# Patient Record
Sex: FEMALE | Race: WHITE | NOT HISPANIC OR LATINO
[De-identification: names, ages, dates, MRNs, and addresses within clinical notes are randomized per-mention and may not be internally consistent; named-entity substitution may affect disease eponyms.]

---

## 2023-08-17 ENCOUNTER — APPOINTMENT (OUTPATIENT)
Dept: ANTEPARTUM | Facility: CLINIC | Age: 38
End: 2023-08-17
Payer: COMMERCIAL

## 2023-08-17 ENCOUNTER — ASOB RESULT (OUTPATIENT)
Age: 38
End: 2023-08-17

## 2023-08-17 PROCEDURE — 36415 COLL VENOUS BLD VENIPUNCTURE: CPT

## 2023-08-17 PROCEDURE — 93976 VASCULAR STUDY: CPT

## 2023-08-17 PROCEDURE — 76813 OB US NUCHAL MEAS 1 GEST: CPT

## 2023-09-14 ENCOUNTER — APPOINTMENT (OUTPATIENT)
Dept: ANTEPARTUM | Facility: CLINIC | Age: 38
End: 2023-09-14
Payer: COMMERCIAL

## 2023-09-14 ENCOUNTER — ASOB RESULT (OUTPATIENT)
Age: 38
End: 2023-09-14

## 2023-09-14 PROCEDURE — 76817 TRANSVAGINAL US OBSTETRIC: CPT

## 2023-09-14 PROCEDURE — 76805 OB US >/= 14 WKS SNGL FETUS: CPT

## 2023-10-19 ENCOUNTER — APPOINTMENT (OUTPATIENT)
Dept: ANTEPARTUM | Facility: CLINIC | Age: 38
End: 2023-10-19
Payer: COMMERCIAL

## 2023-10-19 ENCOUNTER — ASOB RESULT (OUTPATIENT)
Age: 38
End: 2023-10-19

## 2023-10-19 PROCEDURE — 76811 OB US DETAILED SNGL FETUS: CPT

## 2023-10-19 PROCEDURE — 76817 TRANSVAGINAL US OBSTETRIC: CPT | Mod: 59

## 2023-10-22 ENCOUNTER — TRANSCRIPTION ENCOUNTER (OUTPATIENT)
Age: 38
End: 2023-10-22

## 2023-10-26 ENCOUNTER — ASOB RESULT (OUTPATIENT)
Age: 38
End: 2023-10-26

## 2023-10-26 ENCOUNTER — APPOINTMENT (OUTPATIENT)
Dept: ANTEPARTUM | Facility: CLINIC | Age: 38
End: 2023-10-26
Payer: COMMERCIAL

## 2023-10-26 PROCEDURE — 76816 OB US FOLLOW-UP PER FETUS: CPT

## 2023-12-05 ENCOUNTER — APPOINTMENT (OUTPATIENT)
Dept: ANTEPARTUM | Facility: CLINIC | Age: 38
End: 2023-12-05
Payer: COMMERCIAL

## 2023-12-05 ENCOUNTER — ASOB RESULT (OUTPATIENT)
Age: 38
End: 2023-12-05

## 2023-12-05 PROCEDURE — 76816 OB US FOLLOW-UP PER FETUS: CPT

## 2023-12-05 PROCEDURE — 76819 FETAL BIOPHYS PROFIL W/O NST: CPT

## 2023-12-05 PROCEDURE — 76820 UMBILICAL ARTERY ECHO: CPT | Mod: 59

## 2024-01-02 ENCOUNTER — APPOINTMENT (OUTPATIENT)
Dept: MATERNAL FETAL MEDICINE | Facility: CLINIC | Age: 39
End: 2024-01-02
Payer: COMMERCIAL

## 2024-01-02 DIAGNOSIS — O24.419 GESTATIONAL DIABETES MELLITUS IN PREGNANCY, UNSPECIFIED CONTROL: ICD-10-CM

## 2024-01-02 PROBLEM — Z00.00 ENCOUNTER FOR PREVENTIVE HEALTH EXAMINATION: Status: ACTIVE | Noted: 2024-01-02

## 2024-01-02 PROCEDURE — 99205 OFFICE O/P NEW HI 60 MIN: CPT | Mod: 95

## 2024-01-05 NOTE — DISCUSSION/SUMMARY
[FreeTextEntry1] :   Gestational Diabetes: The patient was counseled that maintaining good glycemic control is the key intervention for reducing the frequency and/or severity of complications related to gestational diabetes mellitus (GDM). We reviewed the risks associated with GDM including macrosomia/LGA infant, preeclampsia, polyhydramnios, stillbirth and  morbidities (such as hypoglycemia, hyperbilirubinemia, electrolyte abnormalities, polycythemia, respiratory distress and cardiomyopathy). Risks associated with GDM beyond the pregnancy and  period were also briefly discussed including development of obesity, impaired glucose tolerance, or metabolic syndrome. Fortunately, with good glucose control in pregnancy all these risks can be reduced to near non-GDM levels. GDM is also a strong marker for maternal development of type 2 diabetes. She was counseled that lifestyle interventions, which she is already instituting, such as dietary changes and exercise can reduce her lifetime risk of DM. Exercise in pregnancy was encouraged, and follow-up with the nutritionist as needed.   Recommendations: 1. There is no current indication for insulin therapy.  2. Timing of delivery will depend on the patient's glycemic control.  3. The patient was offered consultation with a nutritionist.  4. The patient will follow up with me in 2-3 weeks for review of her glycemic control

## 2024-01-05 NOTE — HISTORY OF PRESENT ILLNESS
[Home] : at home, [unfilled] , at the time of the visit. [Medical Office: ([facility])___] : at the medical office located in  [Verbal consent obtained from patient] : the patient, [unfilled] [FreeTextEntry1] :  Ms. Mcnulty is a 37 yo  at 32 3/7wga (CARLY 24) presenting for MFM consultation.   She reports no medical or surgical history however has a recent diagnosis of GDM. She started tracking glycemic control within the past week:  F: 91-99 Post prandial: max 135  Her mother has a history of DM that is managed with diet. The patient is scheduled for an elective CS.

## 2024-01-16 ENCOUNTER — APPOINTMENT (OUTPATIENT)
Dept: ANTEPARTUM | Facility: CLINIC | Age: 39
End: 2024-01-16
Payer: COMMERCIAL

## 2024-01-16 ENCOUNTER — ASOB RESULT (OUTPATIENT)
Age: 39
End: 2024-01-16

## 2024-01-16 ENCOUNTER — APPOINTMENT (OUTPATIENT)
Dept: MATERNAL FETAL MEDICINE | Facility: CLINIC | Age: 39
End: 2024-01-16
Payer: COMMERCIAL

## 2024-01-16 PROCEDURE — 76820 UMBILICAL ARTERY ECHO: CPT | Mod: 59

## 2024-01-16 PROCEDURE — 76819 FETAL BIOPHYS PROFIL W/O NST: CPT

## 2024-01-16 PROCEDURE — 76816 OB US FOLLOW-UP PER FETUS: CPT | Mod: 59

## 2024-01-16 PROCEDURE — 99214 OFFICE O/P EST MOD 30 MIN: CPT | Mod: 95

## 2024-01-16 NOTE — HISTORY OF PRESENT ILLNESS
[Home] : at home, [unfilled] , at the time of the visit. [Medical Office: ([facility])___] : at the medical office located in  [Verbal consent obtained from patient] : the patient, [unfilled] [FreeTextEntry1] :  Ms. Mcnulty is a 37 yo  at 34 3/7wga (CARLY 24) presenting for MFM consultation.  She reports the following glycemic control: F: 94, 91, 99, 98, 97, 94, 98, 105, 93, 95, 109, 99, 109, 103, 96, 96, 94 Post prandial: within normal limits   Her mother has a history of DM that is managed with diet. The patient is scheduled for an elective CS.

## 2024-01-16 NOTE — DISCUSSION/SUMMARY
[FreeTextEntry1] :  Recommendations: The patient will begin low dose long acting insulin (4 units) at bedtime. Growth US scheduled for today. She will follow up with me in one week to review her glycemic control.

## 2024-01-18 RX ORDER — PEN NEEDLE, DIABETIC 30 GX5/16"
30G X 8 MM NEEDLE, DISPOSABLE MISCELLANEOUS
Qty: 1 | Refills: 20 | Status: ACTIVE | COMMUNITY
Start: 2024-01-16 | End: 1900-01-01

## 2024-01-19 RX ORDER — INSULIN GLARGINE 100 [IU]/ML
100 INJECTION, SOLUTION SUBCUTANEOUS
Qty: 1 | Refills: 10 | Status: DISCONTINUED | COMMUNITY
Start: 2024-01-16 | End: 2024-01-19

## 2024-01-19 RX ORDER — INSULIN GLARGINE 100 [IU]/ML
100 INJECTION, SOLUTION SUBCUTANEOUS
Qty: 1 | Refills: 20 | Status: ACTIVE | COMMUNITY
Start: 2024-01-19 | End: 1900-01-01

## 2024-01-26 ENCOUNTER — APPOINTMENT (OUTPATIENT)
Dept: MATERNAL FETAL MEDICINE | Facility: CLINIC | Age: 39
End: 2024-01-26
Payer: COMMERCIAL

## 2024-01-26 PROCEDURE — 99213 OFFICE O/P EST LOW 20 MIN: CPT | Mod: 95

## 2024-01-26 NOTE — HISTORY OF PRESENT ILLNESS
[Home] : at home, [unfilled] , at the time of the visit. [Medical Office: ([facility])___] : at the medical office located in  [Verbal consent obtained from patient] : the patient, [unfilled] [FreeTextEntry1] :  Ms. Mcnulty is a 39 yo  at 35 6/7wga (CARLY 24) presenting for MFM consultation.  The patient recently started 4 units of long acting insulin however continues to report suboptimal fasting glycemic control.   Her mother has a history of DM that is managed with diet. The patient is scheduled for an elective CS.

## 2024-01-26 NOTE — DISCUSSION/SUMMARY
[FreeTextEntry1] :  Recommendations: The patient will increase her dosing to 10 units.She will follow up with me in one week to review her glycemic control.

## 2024-02-06 ENCOUNTER — APPOINTMENT (OUTPATIENT)
Dept: ANTEPARTUM | Facility: CLINIC | Age: 39
End: 2024-02-06
Payer: COMMERCIAL

## 2024-02-06 ENCOUNTER — ASOB RESULT (OUTPATIENT)
Age: 39
End: 2024-02-06

## 2024-02-06 PROCEDURE — 76820 UMBILICAL ARTERY ECHO: CPT | Mod: 59

## 2024-02-06 PROCEDURE — 76818 FETAL BIOPHYS PROFILE W/NST: CPT

## 2024-02-06 PROCEDURE — 76816 OB US FOLLOW-UP PER FETUS: CPT | Mod: 59

## 2024-02-09 ENCOUNTER — APPOINTMENT (OUTPATIENT)
Dept: MATERNAL FETAL MEDICINE | Facility: CLINIC | Age: 39
End: 2024-02-09
Payer: COMMERCIAL

## 2024-02-09 PROCEDURE — 99213 OFFICE O/P EST LOW 20 MIN: CPT | Mod: 95

## 2024-02-13 NOTE — DISCUSSION/SUMMARY
[FreeTextEntry1] :  Recommendations: The patient will continue 15 units. She is scheduled for delivery on 2/21.

## 2024-02-13 NOTE — HISTORY OF PRESENT ILLNESS
[Home] : at home, [unfilled] , at the time of the visit. [Medical Office: ([facility])___] : at the medical office located in  [Verbal consent obtained from patient] : the patient, [unfilled] [FreeTextEntry1] : Ms. Mcnulty is a 39 yo  at 37 6/7wga (CARLY 24) presenting for MFM consultation.  The patient is currently on 15 units of insulin and reports improvement in her glycemic control.  Her mother has a history of DM that is managed with diet. The patient is scheduled for an elective CS.

## 2024-02-14 ENCOUNTER — APPOINTMENT (OUTPATIENT)
Dept: ANTEPARTUM | Facility: CLINIC | Age: 39
End: 2024-02-14
Payer: COMMERCIAL

## 2024-02-14 ENCOUNTER — ASOB RESULT (OUTPATIENT)
Age: 39
End: 2024-02-14

## 2024-02-14 PROCEDURE — 76815 OB US LIMITED FETUS(S): CPT | Mod: 59

## 2024-02-14 PROCEDURE — 76820 UMBILICAL ARTERY ECHO: CPT | Mod: 59

## 2024-02-14 PROCEDURE — 76819 FETAL BIOPHYS PROFIL W/O NST: CPT

## 2024-02-19 ENCOUNTER — OUTPATIENT (OUTPATIENT)
Dept: OUTPATIENT SERVICES | Facility: HOSPITAL | Age: 39
LOS: 1 days | End: 2024-02-19
Payer: COMMERCIAL

## 2024-02-19 DIAGNOSIS — Z01.818 ENCOUNTER FOR OTHER PREPROCEDURAL EXAMINATION: ICD-10-CM

## 2024-02-19 LAB
BLD GP AB SCN SERPL QL: NEGATIVE — SIGNIFICANT CHANGE UP
HCT VFR BLD CALC: 38.6 % — SIGNIFICANT CHANGE UP (ref 34.5–45)
HGB BLD-MCNC: 12.9 G/DL — SIGNIFICANT CHANGE UP (ref 11.5–15.5)
MCHC RBC-ENTMCNC: 28.6 PG — SIGNIFICANT CHANGE UP (ref 27–34)
MCHC RBC-ENTMCNC: 33.4 GM/DL — SIGNIFICANT CHANGE UP (ref 32–36)
MCV RBC AUTO: 85.6 FL — SIGNIFICANT CHANGE UP (ref 80–100)
NRBC # BLD: 0 /100 WBCS — SIGNIFICANT CHANGE UP (ref 0–0)
PLATELET # BLD AUTO: 174 K/UL — SIGNIFICANT CHANGE UP (ref 150–400)
RBC # BLD: 4.51 M/UL — SIGNIFICANT CHANGE UP (ref 3.8–5.2)
RBC # FLD: 15.4 % — HIGH (ref 10.3–14.5)
RH IG SCN BLD-IMP: POSITIVE — SIGNIFICANT CHANGE UP
WBC # BLD: 9.07 K/UL — SIGNIFICANT CHANGE UP (ref 3.8–10.5)
WBC # FLD AUTO: 9.07 K/UL — SIGNIFICANT CHANGE UP (ref 3.8–10.5)

## 2024-02-19 PROCEDURE — 86780 TREPONEMA PALLIDUM: CPT

## 2024-02-19 PROCEDURE — 85027 COMPLETE CBC AUTOMATED: CPT

## 2024-02-19 PROCEDURE — 86900 BLOOD TYPING SEROLOGIC ABO: CPT

## 2024-02-19 PROCEDURE — 86850 RBC ANTIBODY SCREEN: CPT

## 2024-02-19 PROCEDURE — 86901 BLOOD TYPING SEROLOGIC RH(D): CPT

## 2024-02-20 ENCOUNTER — APPOINTMENT (OUTPATIENT)
Dept: ANTEPARTUM | Facility: CLINIC | Age: 39
End: 2024-02-20

## 2024-02-20 ENCOUNTER — TRANSCRIPTION ENCOUNTER (OUTPATIENT)
Age: 39
End: 2024-02-20

## 2024-02-20 LAB — T PALLIDUM AB TITR SER: NEGATIVE — SIGNIFICANT CHANGE UP

## 2024-02-21 ENCOUNTER — INPATIENT (INPATIENT)
Facility: HOSPITAL | Age: 39
LOS: 1 days | Discharge: HOME | End: 2024-02-23
Attending: OBSTETRICS & GYNECOLOGY | Admitting: OBSTETRICS & GYNECOLOGY
Payer: COMMERCIAL

## 2024-02-21 VITALS
RESPIRATION RATE: 17 BRPM | WEIGHT: 160.06 LBS | TEMPERATURE: 98 F | SYSTOLIC BLOOD PRESSURE: 103 MMHG | HEIGHT: 63 IN | DIASTOLIC BLOOD PRESSURE: 70 MMHG | HEART RATE: 106 BPM | OXYGEN SATURATION: 99 %

## 2024-02-21 LAB
BLD GP AB SCN SERPL QL: NEGATIVE — SIGNIFICANT CHANGE UP
GLUCOSE BLDC GLUCOMTR-MCNC: 90 MG/DL — SIGNIFICANT CHANGE UP (ref 70–99)
RH IG SCN BLD-IMP: POSITIVE — SIGNIFICANT CHANGE UP

## 2024-02-21 PROCEDURE — 59514 CESAREAN DELIVERY ONLY: CPT | Mod: AS

## 2024-02-21 RX ORDER — DIPHENHYDRAMINE HCL 50 MG
25 CAPSULE ORAL EVERY 6 HOURS
Refills: 0 | Status: DISCONTINUED | OUTPATIENT
Start: 2024-02-21 | End: 2024-02-23

## 2024-02-21 RX ORDER — OXYTOCIN 10 UNIT/ML
333.33 VIAL (ML) INJECTION
Qty: 20 | Refills: 0 | Status: DISCONTINUED | OUTPATIENT
Start: 2024-02-21 | End: 2024-02-21

## 2024-02-21 RX ORDER — TETANUS TOXOID, REDUCED DIPHTHERIA TOXOID AND ACELLULAR PERTUSSIS VACCINE, ADSORBED 5; 2.5; 8; 8; 2.5 [IU]/.5ML; [IU]/.5ML; UG/.5ML; UG/.5ML; UG/.5ML
0.5 SUSPENSION INTRAMUSCULAR ONCE
Refills: 0 | Status: DISCONTINUED | OUTPATIENT
Start: 2024-02-21 | End: 2024-02-23

## 2024-02-21 RX ORDER — IBUPROFEN 200 MG
600 TABLET ORAL EVERY 6 HOURS
Refills: 0 | Status: COMPLETED | OUTPATIENT
Start: 2024-02-21 | End: 2025-01-19

## 2024-02-21 RX ORDER — ENOXAPARIN SODIUM 100 MG/ML
40 INJECTION SUBCUTANEOUS EVERY 24 HOURS
Refills: 0 | Status: DISCONTINUED | OUTPATIENT
Start: 2024-02-22 | End: 2024-02-23

## 2024-02-21 RX ORDER — KETOROLAC TROMETHAMINE 30 MG/ML
30 SYRINGE (ML) INJECTION EVERY 6 HOURS
Refills: 0 | Status: DISCONTINUED | OUTPATIENT
Start: 2024-02-21 | End: 2024-02-23

## 2024-02-21 RX ORDER — OXYCODONE HYDROCHLORIDE 5 MG/1
5 TABLET ORAL
Refills: 0 | Status: COMPLETED | OUTPATIENT
Start: 2024-02-21 | End: 2024-02-28

## 2024-02-21 RX ORDER — SODIUM CHLORIDE 9 MG/ML
1000 INJECTION, SOLUTION INTRAVENOUS
Refills: 0 | Status: DISCONTINUED | OUTPATIENT
Start: 2024-02-21 | End: 2024-02-23

## 2024-02-21 RX ORDER — ACETAMINOPHEN 500 MG
1000 TABLET ORAL ONCE
Refills: 0 | Status: COMPLETED | OUTPATIENT
Start: 2024-02-21 | End: 2024-02-21

## 2024-02-21 RX ORDER — OXYTOCIN 10 UNIT/ML
333.33 VIAL (ML) INJECTION
Qty: 20 | Refills: 0 | Status: DISCONTINUED | OUTPATIENT
Start: 2024-02-21 | End: 2024-02-23

## 2024-02-21 RX ORDER — ACETAMINOPHEN 500 MG
975 TABLET ORAL
Refills: 0 | Status: DISCONTINUED | OUTPATIENT
Start: 2024-02-21 | End: 2024-02-23

## 2024-02-21 RX ORDER — LANOLIN
1 OINTMENT (GRAM) TOPICAL EVERY 6 HOURS
Refills: 0 | Status: DISCONTINUED | OUTPATIENT
Start: 2024-02-21 | End: 2024-02-23

## 2024-02-21 RX ORDER — SIMETHICONE 80 MG/1
80 TABLET, CHEWABLE ORAL EVERY 4 HOURS
Refills: 0 | Status: DISCONTINUED | OUTPATIENT
Start: 2024-02-21 | End: 2024-02-23

## 2024-02-21 RX ORDER — MAGNESIUM HYDROXIDE 400 MG/1
30 TABLET, CHEWABLE ORAL
Refills: 0 | Status: DISCONTINUED | OUTPATIENT
Start: 2024-02-21 | End: 2024-02-23

## 2024-02-21 RX ORDER — FAMOTIDINE 10 MG/ML
20 INJECTION INTRAVENOUS ONCE
Refills: 0 | Status: COMPLETED | OUTPATIENT
Start: 2024-02-21 | End: 2024-02-21

## 2024-02-21 RX ORDER — OXYCODONE HYDROCHLORIDE 5 MG/1
5 TABLET ORAL ONCE
Refills: 0 | Status: DISCONTINUED | OUTPATIENT
Start: 2024-02-21 | End: 2024-02-23

## 2024-02-21 RX ORDER — SODIUM CHLORIDE 9 MG/ML
1000 INJECTION, SOLUTION INTRAVENOUS
Refills: 0 | Status: DISCONTINUED | OUTPATIENT
Start: 2024-02-21 | End: 2024-02-21

## 2024-02-21 RX ORDER — SODIUM CHLORIDE 9 MG/ML
1000 INJECTION, SOLUTION INTRAVENOUS ONCE
Refills: 0 | Status: COMPLETED | OUTPATIENT
Start: 2024-02-21 | End: 2024-02-21

## 2024-02-21 RX ORDER — CHLORHEXIDINE GLUCONATE 213 G/1000ML
1 SOLUTION TOPICAL DAILY
Refills: 0 | Status: DISCONTINUED | OUTPATIENT
Start: 2024-02-21 | End: 2024-02-21

## 2024-02-21 RX ORDER — CEFAZOLIN SODIUM 1 G
2000 VIAL (EA) INJECTION ONCE
Refills: 0 | Status: COMPLETED | OUTPATIENT
Start: 2024-02-21 | End: 2024-02-21

## 2024-02-21 RX ORDER — CITRIC ACID/SODIUM CITRATE 300-500 MG
30 SOLUTION, ORAL ORAL ONCE
Refills: 0 | Status: COMPLETED | OUTPATIENT
Start: 2024-02-21 | End: 2024-02-21

## 2024-02-21 RX ADMIN — Medication 30 MILLIGRAM(S): at 12:15

## 2024-02-21 RX ADMIN — SODIUM CHLORIDE 2000 MILLILITER(S): 9 INJECTION, SOLUTION INTRAVENOUS at 07:15

## 2024-02-21 RX ADMIN — CHLORHEXIDINE GLUCONATE 1 APPLICATION(S): 213 SOLUTION TOPICAL at 07:42

## 2024-02-21 RX ADMIN — Medication 400 MILLIGRAM(S): at 09:15

## 2024-02-21 RX ADMIN — Medication 30 MILLILITER(S): at 07:38

## 2024-02-21 RX ADMIN — FAMOTIDINE 20 MILLIGRAM(S): 10 INJECTION INTRAVENOUS at 07:38

## 2024-02-21 RX ADMIN — Medication 1000 MILLIUNIT(S)/MIN: at 10:03

## 2024-02-21 RX ADMIN — SODIUM CHLORIDE 125 MILLILITER(S): 9 INJECTION, SOLUTION INTRAVENOUS at 18:15

## 2024-02-21 RX ADMIN — Medication 100 MILLIGRAM(S): at 08:00

## 2024-02-21 RX ADMIN — Medication 30 MILLIGRAM(S): at 18:15

## 2024-02-21 RX ADMIN — Medication 30 MILLIGRAM(S): at 12:00

## 2024-02-21 RX ADMIN — Medication 1000 MILLIGRAM(S): at 09:45

## 2024-02-21 RX ADMIN — Medication 975 MILLIGRAM(S): at 22:09

## 2024-02-21 RX ADMIN — Medication 975 MILLIGRAM(S): at 14:55

## 2024-02-21 NOTE — LACTATION INITIAL EVALUATION - NIPPLE ASSESSMENT (RIGHT)
Gil after stimulation. Minor bruise on tip of nipple. Pt is applying nipple cream./medium/normal/dry and intact/compressible

## 2024-02-21 NOTE — OB PROVIDER DELIVERY SUMMARY - NSSELHIDDEN_OBGYN_ALL_OB_FT
[NS_DeliveryAttending1_OBGYN_ALL_OB_FT:[id number]==],[NS_DeliveryAssist1_OBGYN_ALL_OB_FT:[id number]=]

## 2024-02-21 NOTE — LACTATION INITIAL EVALUATION - LACTATION INTERVENTIONS
initiate/review safe skin-to-skin/initiate/review hand expression/initiate/review techniques for position and latch/post discharge community resources provided/reviewed components of an effective feeding and at least 8 effective feedings per day required/reviewed importance of monitoring infant diapers, the breastfeeding log, and minimum output each day/reviewed risks of unnecessary formula supplementation/reviewed benefits and recommendations for rooming in/reviewed feeding on demand/by cue at least 8 times a day/reviewed indications of inadequate milk transfer that would require supplementation

## 2024-02-21 NOTE — OB RN INTRAOPERATIVE NOTE - NSSELHIDDEN_OBGYN_ALL_OB_FT
[NS_DeliveryAttending1_OBGYN_ALL_OB_FT:[id number]==],[NS_DeliveryAssist1_OBGYN_ALL_OB_FT:[id number]=],[NS_DeliveryRN_OBGYN_ALL_OB_FT:[id number]=],[NS_CirculateRN2_OBGYN_ALL_OB_FT:[id number]=]

## 2024-02-21 NOTE — PRE-ANESTHESIA EVALUATION ADULT - NSANTHOSAYNRD_GEN_A_CORE
No. YOMAIRA screening performed.  STOP BANG Legend: 0-2 = LOW Risk; 3-4 = INTERMEDIATE Risk; 5-8 = HIGH Risk

## 2024-02-21 NOTE — OB RN DELIVERY SUMMARY - NS_BREASTINHOURA_OBGYN_ALL_OB
Left a voice message. F/u from nurse triage call and ED visit, see encounter.    Offered, feeding was successful

## 2024-02-21 NOTE — OB PROVIDER DELIVERY SUMMARY - NSPOSTOPDXA_OBGYN_ALL_OB
Same as Pre-Op Diagnosis [place] Counseling:  I discussed with the patient the risks of imiquimod including but not limited to erythema, scaling, itching, weeping, crusting, and pain.  Patient understands that the inflammatory response to imiquimod is variable from person to person and was educated regarded proper titration schedule.  If flu-like symptoms develop, patient knows to discontinue the medication and contact us.

## 2024-02-21 NOTE — OB RN DELIVERY SUMMARY - NSSELHIDDEN_OBGYN_ALL_OB_FT
[NS_DeliveryAttending1_OBGYN_ALL_OB_FT:[id number]==],[NS_DeliveryAssist1_OBGYN_ALL_OB_FT:[id number]=]

## 2024-02-21 NOTE — OB RN PATIENT PROFILE - NS_SUPPORTPERSONNAME_OBGYN_ALL_OB_FT
[facility]  Dr. Nolasco  [phone]    WOUND CARE INSTRUCTIONS      Begin in 48 hours    1. Cleanse wound gently with tap water and Q-tip once daily.  If there is drainage, remove as much as possible.   2. Apply Vaseline-do not let the wound dry out.  Reapply Vaseline as needed.    3.  Cover the wound with a clean Band-Aid or non-stick dressing, unless instructed differently.    4.  If there is any oozing or bleeding, apply firm pressure for 5 minutes or more.      You may shower, however reapply vaseline and bandaid.          Please call with any questions.                  
Shahid Mcnulty

## 2024-02-21 NOTE — LACTATION INITIAL EVALUATION - NS LACT CON REASON FOR REQ
At the time of consult, baby was awake showing feeding cues. Baby was able to latch in laid back cradle position deeply with minimum assist, his lips were widely flanged and suck was rhythmic and sustained for 30+ minutes and on going when LC left the room. Responsive feeding, skin to skin encouraged. Education done to mother and father as documented as below. Tele lactation to followup as documented below./primaparous mom

## 2024-02-21 NOTE — OB PROVIDER H&P - HISTORY OF PRESENT ILLNESS
Patient is a 38 year old  at 39w1d presenting for primary  section, for macrosomia. Pt feeling well.     Reports +FM, no LOF/VB/CTX. Denies HA, vision changes, RUQ/epigastric pain.     Ante: Spontaneous pregnancy c/b GDMA2, well controlled (fasting <100, postprandial <110) otherwise uncomplicated pregnancy on lantus 60U qhs. NIPT and sonograms WNL. GCT failed. GBS negative. EFW 4100g    ObHx:   SAB  GYNHx: Denies hx fibroids, cysts, STIs, abnormal pap smears  PMHx: Denies  SurgHx: Denies  Meds: Lantus 60U qhs  No Known Allergies      PE  T(C): --  HR: --  BP: --  RR: --  SpO2: --  General: NAD; Lying comfortably in bed  Pulm: No increased work of breathing noted.   Abdomen: Soft, nontender, gravid.   Extremities: No calf tenderness or swelling noted bilaterally.     NST: Cat I tracing. Baseline 140bpm. Moderate variability. +accels, no decels noted.    [place]: Irregular contractions.   TAUS: Cephalic presentation, posterior placenta.     A/P  38 year old  at 39w1d presenting for primary C section, macrosomia and GDMA2.   - Admit to labor and delivery  - NPO and IVF  - NST reactive and reassuring. Continue EFM.   - Prenatals reviewed, routine labs ordered.   - Ancef for infection prophylaxis   - Consents signed. Risks and benefits discussed.     Plan discussed w/ Dr. Rowley.

## 2024-02-22 LAB
BASOPHILS # BLD AUTO: 0.03 K/UL — SIGNIFICANT CHANGE UP (ref 0–0.2)
BASOPHILS NFR BLD AUTO: 0.3 % — SIGNIFICANT CHANGE UP (ref 0–2)
EOSINOPHIL # BLD AUTO: 0.04 K/UL — SIGNIFICANT CHANGE UP (ref 0–0.5)
EOSINOPHIL NFR BLD AUTO: 0.3 % — SIGNIFICANT CHANGE UP (ref 0–6)
HCT VFR BLD CALC: 33.2 % — LOW (ref 34.5–45)
HGB BLD-MCNC: 10.6 G/DL — LOW (ref 11.5–15.5)
IMM GRANULOCYTES NFR BLD AUTO: 0.6 % — SIGNIFICANT CHANGE UP (ref 0–0.9)
LYMPHOCYTES # BLD AUTO: 2.34 K/UL — SIGNIFICANT CHANGE UP (ref 1–3.3)
LYMPHOCYTES # BLD AUTO: 20.4 % — SIGNIFICANT CHANGE UP (ref 13–44)
MCHC RBC-ENTMCNC: 28.3 PG — SIGNIFICANT CHANGE UP (ref 27–34)
MCHC RBC-ENTMCNC: 31.9 GM/DL — LOW (ref 32–36)
MCV RBC AUTO: 88.8 FL — SIGNIFICANT CHANGE UP (ref 80–100)
MONOCYTES # BLD AUTO: 1.16 K/UL — HIGH (ref 0–0.9)
MONOCYTES NFR BLD AUTO: 10.1 % — SIGNIFICANT CHANGE UP (ref 2–14)
NEUTROPHILS # BLD AUTO: 7.85 K/UL — HIGH (ref 1.8–7.4)
NEUTROPHILS NFR BLD AUTO: 68.3 % — SIGNIFICANT CHANGE UP (ref 43–77)
NRBC # BLD: 0 /100 WBCS — SIGNIFICANT CHANGE UP (ref 0–0)
PLATELET # BLD AUTO: 126 K/UL — LOW (ref 150–400)
RBC # BLD: 3.74 M/UL — LOW (ref 3.8–5.2)
RBC # FLD: 15.2 % — HIGH (ref 10.3–14.5)
WBC # BLD: 11.49 K/UL — HIGH (ref 3.8–10.5)
WBC # FLD AUTO: 11.49 K/UL — HIGH (ref 3.8–10.5)

## 2024-02-22 RX ORDER — OXYCODONE HYDROCHLORIDE 5 MG/1
5 TABLET ORAL
Refills: 0 | Status: DISCONTINUED | OUTPATIENT
Start: 2024-02-22 | End: 2024-02-23

## 2024-02-22 RX ADMIN — Medication 975 MILLIGRAM(S): at 20:32

## 2024-02-22 RX ADMIN — Medication 30 MILLIGRAM(S): at 23:16

## 2024-02-22 RX ADMIN — SIMETHICONE 80 MILLIGRAM(S): 80 TABLET, CHEWABLE ORAL at 18:02

## 2024-02-22 RX ADMIN — SIMETHICONE 80 MILLIGRAM(S): 80 TABLET, CHEWABLE ORAL at 12:16

## 2024-02-22 RX ADMIN — Medication 975 MILLIGRAM(S): at 03:12

## 2024-02-22 RX ADMIN — Medication 975 MILLIGRAM(S): at 14:46

## 2024-02-22 RX ADMIN — ENOXAPARIN SODIUM 40 MILLIGRAM(S): 100 INJECTION SUBCUTANEOUS at 06:18

## 2024-02-22 RX ADMIN — Medication 30 MILLIGRAM(S): at 18:10

## 2024-02-22 RX ADMIN — Medication 30 MILLIGRAM(S): at 00:09

## 2024-02-22 RX ADMIN — Medication 30 MILLIGRAM(S): at 12:06

## 2024-02-22 RX ADMIN — Medication 975 MILLIGRAM(S): at 09:17

## 2024-02-22 RX ADMIN — Medication 30 MILLIGRAM(S): at 06:17

## 2024-02-23 ENCOUNTER — TRANSCRIPTION ENCOUNTER (OUTPATIENT)
Age: 39
End: 2024-02-23

## 2024-02-23 VITALS
DIASTOLIC BLOOD PRESSURE: 63 MMHG | OXYGEN SATURATION: 96 % | RESPIRATION RATE: 18 BRPM | HEART RATE: 93 BPM | TEMPERATURE: 98 F | SYSTOLIC BLOOD PRESSURE: 97 MMHG

## 2024-02-23 PROCEDURE — 85025 COMPLETE CBC W/AUTO DIFF WBC: CPT

## 2024-02-23 PROCEDURE — 86850 RBC ANTIBODY SCREEN: CPT

## 2024-02-23 PROCEDURE — 59050 FETAL MONITOR W/REPORT: CPT

## 2024-02-23 PROCEDURE — 86900 BLOOD TYPING SEROLOGIC ABO: CPT

## 2024-02-23 PROCEDURE — 86901 BLOOD TYPING SEROLOGIC RH(D): CPT

## 2024-02-23 PROCEDURE — 36415 COLL VENOUS BLD VENIPUNCTURE: CPT

## 2024-02-23 PROCEDURE — 82962 GLUCOSE BLOOD TEST: CPT

## 2024-02-23 RX ORDER — IBUPROFEN 200 MG
600 TABLET ORAL EVERY 6 HOURS
Refills: 0 | Status: DISCONTINUED | OUTPATIENT
Start: 2024-02-23 | End: 2024-02-23

## 2024-02-23 RX ORDER — IBUPROFEN 200 MG
1 TABLET ORAL
Qty: 0 | Refills: 0 | DISCHARGE
Start: 2024-02-23

## 2024-02-23 RX ORDER — ACETAMINOPHEN 500 MG
3 TABLET ORAL
Qty: 0 | Refills: 0 | DISCHARGE
Start: 2024-02-23

## 2024-02-23 RX ORDER — INSULIN GLARGINE 100 [IU]/ML
0 INJECTION, SOLUTION SUBCUTANEOUS
Refills: 0 | DISCHARGE

## 2024-02-23 RX ADMIN — ENOXAPARIN SODIUM 40 MILLIGRAM(S): 100 INJECTION SUBCUTANEOUS at 06:42

## 2024-02-23 RX ADMIN — Medication 600 MILLIGRAM(S): at 05:40

## 2024-02-23 RX ADMIN — Medication 975 MILLIGRAM(S): at 02:56

## 2024-02-23 RX ADMIN — Medication 975 MILLIGRAM(S): at 09:00

## 2024-02-23 RX ADMIN — SIMETHICONE 80 MILLIGRAM(S): 80 TABLET, CHEWABLE ORAL at 02:59

## 2024-02-23 RX ADMIN — SIMETHICONE 80 MILLIGRAM(S): 80 TABLET, CHEWABLE ORAL at 09:06

## 2024-02-23 RX ADMIN — Medication 600 MILLIGRAM(S): at 11:21

## 2024-02-23 RX ADMIN — MAGNESIUM HYDROXIDE 30 MILLILITER(S): 400 TABLET, CHEWABLE ORAL at 05:45

## 2024-02-23 NOTE — DISCHARGE NOTE OB - NS MD DC FALL RISK RISK
For information on Fall & Injury Prevention, visit: [email or web address] OR  [email or web address] OR  https://www.cdc.gov/steadi/patient.html

## 2024-02-23 NOTE — DISCHARGE NOTE OB - PATIENT PORTAL LINK FT
You can access the FollowMyHealth Patient Portal offered by  by registering at the following website: [email or web address]. By joining [employer]’s FollowMyHealth portal, you will also be able to view your health information using other applications (apps) compatible with our system.

## 2024-02-23 NOTE — PROGRESS NOTE ADULT - SUBJECTIVE AND OBJECTIVE BOX
Patient evaluated at bedside this morning on POD#2, resting comfortable in bed.   She reports pain is well controlled with acetaminophen and ibuprofen.  She denies headache, dizziness, chest pain, palpitations, shortness of breath, nausea, vomiting, or heavy vaginal bleeding.  She has been ambulating without assistance, voiding spontaneously, passing gas, tolerating regular diet, and is breastfeeding.    Physical Exam:  Vital Signs Last 24 Hrs  T(C): 36.6 (23 Feb 2024 09:20), Max: 37 (23 Feb 2024 05:44)  T(F): 97.8 (23 Feb 2024 09:20), Max: 98.6 (23 Feb 2024 05:44)  HR: 93 (23 Feb 2024 09:20) (71 - 93)  BP: 97/63 (23 Feb 2024 09:20) (92/56 - 106/60)  BP(mean): --  RR: 18 (23 Feb 2024 09:20) (17 - 18)  SpO2: 96% (23 Feb 2024 09:20) (96% - 98%)    Parameters below as of 23 Feb 2024 09:20  Patient On (Oxygen Delivery Method): room air        GA: comfortable in NAD  Abd: soft, nontender, nondistended, no rebound or guarding, incision clean, dry and intact, uterus firm at midline, 2 fb below umbilicus  : lochia WNL  Extremities: no swelling or calf tenderness                             10.6   11.49 )-----------( 126      ( 22 Feb 2024 05:30 )             33.2               
Patient seen and evaluated at bedside. Pt states she has mild abdominal pain, overall controlled with pain medication. She is tolerating reg diet, passing flatus, and voiding. Pt is breastfeeding.  She denies headache, dizziness, chest pain, palpitations, shortness of breath, nausea, vomiting, or heavy vaginal bleeding.    Physical Exam:  Vital Signs Last 24 Hrs  T(C): 36.4 (22 Feb 2024 05:45), Max: 37.2 (21 Feb 2024 22:26)  T(F): 97.6 (22 Feb 2024 05:45), Max: 99 (21 Feb 2024 22:26)  HR: 68 (22 Feb 2024 05:45) (64 - 108)  BP: 96/59 (22 Feb 2024 05:45) (96/58 - 115/61)  BP(mean): 76 (21 Feb 2024 11:40) (70 - 81)  RR: 17 (22 Feb 2024 05:45) (14 - 18)  SpO2: 98% (22 Feb 2024 05:45) (98% - 100%)    Parameters below as of 21 Feb 2024 18:00  Patient On (Oxygen Delivery Method): room air        GA: comfortable in NAD  Abd: soft, nontender, nondistended, no rebound or guarding, incision clean, dry and intact, uterus firm at midline, 2 fb below umbilicus  : Voiding spontaneously.   Extremities: no swelling or calf tenderness, SCDs in place                            10.6   11.49 )-----------( 126      ( 22 Feb 2024 05:30 )             33.2               acetaminophen     Tablet .. 975 milliGRAM(s) Oral <User Schedule>  diphenhydrAMINE 25 milliGRAM(s) Oral every 6 hours PRN  diphtheria/tetanus/pertussis (acellular) Vaccine (Adacel) 0.5 milliLiter(s) IntraMuscular once  enoxaparin Injectable 40 milliGRAM(s) SubCutaneous every 24 hours  ibuprofen  Tablet. 600 milliGRAM(s) Oral every 6 hours  ketorolac   Injectable 30 milliGRAM(s) IV Push every 6 hours  lactated ringers. 1000 milliLiter(s) IV Continuous <Continuous>  lanolin Ointment 1 Application(s) Topical every 6 hours PRN  magnesium hydroxide Suspension 30 milliLiter(s) Oral two times a day PRN  oxyCODONE    IR 5 milliGRAM(s) Oral every 3 hours PRN  oxyCODONE    IR 5 milliGRAM(s) Oral once PRN  oxytocin Infusion 333.333 milliUNIT(s)/Min IV Continuous <Continuous>  simethicone 80 milliGRAM(s) Chew every 4 hours PRN

## 2024-02-23 NOTE — DISCHARGE NOTE OB - CARE PROVIDER_API CALL
Pauline Rowley  Obstetrics and Gynecology  [address] [place] [id number]  Phone: [phone]  Fax: [phone]  Follow Up Time: 2 weeks

## 2024-02-23 NOTE — PROGRESS NOTE ADULT - ASSESSMENT
A/P: 38y s/p  section, POD #2, stable  -  Pain: PO motrin/acetaminophen, oxycodone for severe pain PRN  -  Post-operatively labs: post-op Hgb stable  -  GI: tolerating regular diet, passing gas  -  : voiding spontaneously  -  DVT prophylaxis: ambulation, SCDs, Lovenox  -  Dispo: today
A/P: 38y POD1 s/p  section. Pt is hemodynamically stable.   -  Neuro-Pain control with motrin/acetaminophen, oxycodone for severe pain PRN  -  Cardio: no issues. Continue to monitor routinely   -  GI: Reg diet, Reglan/Zofran prn   -  : s/p van- voiding spontaneously.   -  DVT prophylaxis: Lovenox 40mg qd, SCDs  -  Activity- ambulate as tolerated   -  Heme: post-op hemoglobin stable   Dispo: POD 2 or 3, pending clearance from attending.

## 2024-02-28 DIAGNOSIS — O40.3XX0 POLYHYDRAMNIOS, THIRD TRIMESTER, NOT APPLICABLE OR UNSPECIFIED: ICD-10-CM

## 2024-02-28 DIAGNOSIS — Z28.21 IMMUNIZATION NOT CARRIED OUT BECAUSE OF PATIENT REFUSAL: ICD-10-CM

## 2024-02-28 DIAGNOSIS — Z3A.39 39 WEEKS GESTATION OF PREGNANCY: ICD-10-CM

## 2024-02-28 DIAGNOSIS — O36.63X0 MATERNAL CARE FOR EXCESSIVE FETAL GROWTH, THIRD TRIMESTER, NOT APPLICABLE OR UNSPECIFIED: ICD-10-CM

## 2024-02-28 DIAGNOSIS — Z28.09 IMMUNIZATION NOT CARRIED OUT BECAUSE OF OTHER CONTRAINDICATION: ICD-10-CM
